# Patient Record
Sex: FEMALE | Race: WHITE | NOT HISPANIC OR LATINO | Employment: OTHER | URBAN - NONMETROPOLITAN AREA
[De-identification: names, ages, dates, MRNs, and addresses within clinical notes are randomized per-mention and may not be internally consistent; named-entity substitution may affect disease eponyms.]

---

## 2017-03-01 ENCOUNTER — OFFICE VISIT (OUTPATIENT)
Dept: GASTROENTEROLOGY | Facility: CLINIC | Age: 47
End: 2017-03-01

## 2017-03-01 VITALS
WEIGHT: 91.2 LBS | DIASTOLIC BLOOD PRESSURE: 62 MMHG | HEIGHT: 63 IN | SYSTOLIC BLOOD PRESSURE: 96 MMHG | HEART RATE: 75 BPM | TEMPERATURE: 98.1 F | OXYGEN SATURATION: 98 % | BODY MASS INDEX: 16.16 KG/M2

## 2017-03-01 DIAGNOSIS — R10.33 PERIUMBILICAL ABDOMINAL PAIN: Primary | ICD-10-CM

## 2017-03-01 DIAGNOSIS — R63.4 WEIGHT LOSS: ICD-10-CM

## 2017-03-01 PROCEDURE — 99203 OFFICE O/P NEW LOW 30 MIN: CPT | Performed by: NURSE PRACTITIONER

## 2017-03-01 NOTE — PROGRESS NOTES
Chief Complaint   Patient presents with   • Weight Loss     Patiet states she has lost 20 pounds in the last year.  She has been Dx with IBS in the past and has had problems with her thyroid before.  Past colonoscopys were done in [place], [place].       Subjective     HPI    Increasing weight loss x 1 yr.  Weight loss has worsened over past 6 months.  Review of records from referring physician indicate she wt 110 lb one year ago.  She has occasional dull pain in periumbilical area with radiation into LLQ.  No fever or chills.  She has nausea, no emesis.  Normal bowel habits described as alternating between loose/watery diarrhea and constipation.  She will not have BM for 3-4 days then experience few days with multiple loose/watery stools.  No BRBPR or melena stools.  Pt denies decrease in appetite.  She does have a history of heartburn but this is controlled with use of Nexium daily.  No dysphagia.  No family hx of CRC or colon polyps.  Pt is a smoker.  Last CScope over 5 yr ago in [place].  No hx of colon polyps.    Labs 2/9/17  TSH 0.794  Free T4 1.32  CBC unremarkable  T Bili 1.06  Direct Bili 0.21  Indirect Bili 0.9    Past Medical History   Diagnosis Date   • GERD (gastroesophageal reflux disease)    • Hyperlipidemia    • Hypothyroidism    • IBS (irritable bowel syndrome)    • Vaginal cancer        Past Surgical History   Procedure Laterality Date   • Colonoscopy     • Hysterectomy     • Back surgery     • Cholecystectomy     • Appendectomy     • Throat surgery         Outpatient Prescriptions Marked as Taking for the 3/1/17 encounter (Office Visit) with ALBA Kim   Medication Sig Dispense Refill   • ALPRAZolam (XANAX) 0.25 MG tablet Take 0.25 mg by mouth 3 (Three) Times a Day As Needed for anxiety.     • aspirin 81 MG EC tablet Take 81 mg by mouth Daily.     • baclofen (LIORESAL) 10 MG tablet Take 10 mg by mouth 3 (Three) Times a Day.     • diclofenac (VOLTAREN) 1 % gel gel Apply 4 g  topically 4 (Four) Times a Day.     • Diclofenac Sodium (PENNSAID) 2 % solution Place 20 mg on the skin 2 (Two) Times a Day.     • esomeprazole (nexIUM) 40 MG capsule Take 40 mg by mouth Daily.     • estrogens, conjugated, (PREMARIN) 1.25 MG tablet Take 1.25 mg by mouth Take As Directed.     • gabapentin (NEURONTIN) 300 MG capsule Take 300 mg by mouth 4 (Four) Times a Day.     • HYDROcodone-acetaminophen (NORCO)  MG per tablet Take 1 tablet by mouth Every 8 (Eight) Hours As Needed for moderate pain (4-6).     • levothyroxine (SYNTHROID, LEVOTHROID) 50 MCG tablet Take 50 mcg by mouth Daily.     • lidocaine (LIDODERM) 5 % Place 1 patch on the skin Daily. Remove & Discard patch within 12 hours or as directed by MD     • Multiple Vitamins-Minerals (MULTIVITAMIN ADULT PO) Take  by mouth Daily.     • simvastatin (ZOCOR) 20 MG tablet Take 20 mg by mouth Every Night.     • traMADol (ULTRAM) 50 MG tablet Take 50 mg by mouth Every 6 (Six) Hours As Needed for moderate pain (4-6).         No Known Allergies    Social History     Social History   • Marital status:      Spouse name: N/A   • Number of children: N/A   • Years of education: N/A     Occupational History   • Not on file.     Social History Main Topics   • Smoking status: Current Every Day Smoker     Types: Cigarettes   • Smokeless tobacco: Not on file   • Alcohol use No   • Drug use: Not on file   • Sexual activity: Not on file     Other Topics Concern   • Not on file     Social History Narrative       Family History   Problem Relation Age of Onset   • Colon cancer Neg Hx    • Colon polyps Neg Hx    • Esophageal cancer Neg Hx    • Liver cancer Neg Hx    • Liver disease Neg Hx    • Rectal cancer Neg Hx    • Stomach cancer Neg Hx        Review of Systems   Constitutional: Positive for unexpected weight change. Negative for fatigue and fever.   HENT: Negative for hearing loss, sore throat and voice change.    Eyes: Negative for visual disturbance.  "  Respiratory: Negative for cough, shortness of breath and wheezing.    Cardiovascular: Negative for chest pain and palpitations.   Gastrointestinal: Positive for abdominal pain. Negative for blood in stool and vomiting.   Endocrine: Negative for polydipsia and polyuria.   Genitourinary: Negative for difficulty urinating, dysuria, hematuria and urgency.   Musculoskeletal: Negative for joint swelling and myalgias.   Skin: Negative for color change, rash and wound.   Neurological: Negative for dizziness, tremors, seizures and syncope.   Hematological: Does not bruise/bleed easily.   Psychiatric/Behavioral: Negative for agitation and confusion. The patient is not nervous/anxious.        Objective     Vitals:    03/01/17 0900   BP: 96/62   Pulse: 75   Temp: 98.1 °F (36.7 °C)   SpO2: 98%   Weight: 91 lb 3.2 oz (41.4 kg)   Height: 63\" (160 cm)     Body mass index is 16.16 kg/(m^2).    Physical Exam   Constitutional: She is oriented to person, place, and time. She appears well-developed.   HENT:   Head: Normocephalic and atraumatic.   Eyes: Conjunctivae are normal. Pupils are equal, round, and reactive to light. No scleral icterus.   Neck: No JVD present. No thyroid mass and no thyromegaly present.   Cardiovascular: Normal rate, regular rhythm and normal heart sounds.  Exam reveals no gallop and no friction rub.    No murmur heard.  Pulmonary/Chest: Effort normal and breath sounds normal. No accessory muscle usage. No respiratory distress. She has no wheezes. She has no rales.   Abdominal: Soft. Bowel sounds are normal. She exhibits no distension, no ascites and no mass. There is no splenomegaly or hepatomegaly. There is no tenderness. There is no rebound and no guarding.   Genitourinary:   Genitourinary Comments: Rectal-Did not examine   Musculoskeletal: Normal range of motion. She exhibits no edema.   Neurological: She is alert and oriented to person, place, and time.   Deemed a reliable historian, able to converse " without difficulty and able to move all extremities without difficulty   Skin: Skin is warm and dry.   Psychiatric: She has a normal mood and affect. Her behavior is normal.       Imaging Results (most recent)     None          Assessment/Plan     Zahraa was seen today for weight loss.    Diagnoses and all orders for this visit:    Periumbilical abdominal pain  -     Case request  -     polyethylene glycol (GOLYTELY) 236 G solution; Take as directed    Weight loss  -     Cancel: CT Abdomen Pelvis With Contrast  -     CT Abdomen Pelvis With Contrast        COLONOSCOPY WITH ANESTHESIA (N/A)       All risks, benefits, alternatives, and indications of colonoscopy procedure have been discussed with the patient. Risks to include perforation of the colon requiring possible surgery or colostomy, risk of bleeding from biopsies or removal of colon tissue, possibility of missing a colon polyp or cancer, or adverse drug reaction.  Benefits to include the diagnosis and management of disease of the colon and rectum. Alternatives to include barium enema, radiographic evaluation, lab testing or no intervention. Pt verbalizes understanding and agrees to proceed with procedure.    EGD may be considered if above w/u is negative    There are no Patient Instructions on file for this visit.

## 2017-03-14 ENCOUNTER — HOSPITAL ENCOUNTER (OUTPATIENT)
Dept: GENERAL RADIOLOGY | Facility: HOSPITAL | Age: 47
Discharge: HOME | End: 2017-03-14

## 2017-03-14 ENCOUNTER — HOSPITAL ENCOUNTER (OUTPATIENT)
Dept: CT IMAGING | Facility: HOSPITAL | Age: 47
Discharge: HOME | End: 2017-03-14
Admitting: NURSE PRACTITIONER

## 2017-03-14 ENCOUNTER — TELEPHONE (OUTPATIENT)
Dept: GASTROENTEROLOGY | Facility: CLINIC | Age: 47
End: 2017-03-14

## 2017-03-14 ENCOUNTER — TRANSCRIBE ORDERS (OUTPATIENT)
Dept: ADMINISTRATIVE | Facility: HOSPITAL | Age: 47
End: 2017-03-14

## 2017-03-14 DIAGNOSIS — R63.4 LOSS OF WEIGHT: ICD-10-CM

## 2017-03-14 DIAGNOSIS — R63.4 LOSS OF WEIGHT: Primary | ICD-10-CM

## 2017-03-14 LAB — CREAT BLDA-MCNC: 0.6 MG/DL (ref 0.6–1.3)

## 2017-03-14 PROCEDURE — 82565 ASSAY OF CREATININE: CPT

## 2017-03-14 PROCEDURE — 71020 HC CHEST PA AND LATERAL: CPT

## 2017-03-14 PROCEDURE — 0 IOPAMIDOL 61 % SOLUTION: Performed by: NURSE PRACTITIONER

## 2017-03-14 PROCEDURE — 74177 CT ABD & PELVIS W/CONTRAST: CPT

## 2017-03-14 RX ADMIN — IOPAMIDOL 100 ML: 612 INJECTION, SOLUTION INTRAVENOUS at 10:15

## 2017-03-20 ENCOUNTER — ANESTHESIA EVENT (OUTPATIENT)
Dept: GASTROENTEROLOGY | Facility: HOSPITAL | Age: 47
End: 2017-03-20

## 2017-03-22 ENCOUNTER — HOSPITAL ENCOUNTER (OUTPATIENT)
Facility: HOSPITAL | Age: 47
Setting detail: HOSPITAL OUTPATIENT SURGERY
Discharge: HOME | End: 2017-03-22
Attending: INTERNAL MEDICINE | Admitting: INTERNAL MEDICINE

## 2017-03-22 ENCOUNTER — ANESTHESIA (OUTPATIENT)
Dept: GASTROENTEROLOGY | Facility: HOSPITAL | Age: 47
End: 2017-03-22

## 2017-03-22 VITALS
TEMPERATURE: 97.6 F | HEIGHT: 63 IN | WEIGHT: 84 LBS | DIASTOLIC BLOOD PRESSURE: 51 MMHG | SYSTOLIC BLOOD PRESSURE: 108 MMHG | BODY MASS INDEX: 14.88 KG/M2 | OXYGEN SATURATION: 100 % | RESPIRATION RATE: 14 BRPM | HEART RATE: 53 BPM

## 2017-03-22 PROCEDURE — 25010000002 PROPOFOL 10 MG/ML EMULSION: Performed by: NURSE ANESTHETIST, CERTIFIED REGISTERED

## 2017-03-22 PROCEDURE — 45378 DIAGNOSTIC COLONOSCOPY: CPT | Performed by: INTERNAL MEDICINE

## 2017-03-22 PROCEDURE — 83516 IMMUNOASSAY NONANTIBODY: CPT | Performed by: INTERNAL MEDICINE

## 2017-03-22 RX ORDER — SODIUM CHLORIDE 9 MG/ML
100 INJECTION, SOLUTION INTRAVENOUS CONTINUOUS
Status: DISCONTINUED | OUTPATIENT
Start: 2017-03-22 | End: 2017-03-22 | Stop reason: HOSPADM

## 2017-03-22 RX ORDER — PROPOFOL 10 MG/ML
VIAL (ML) INTRAVENOUS AS NEEDED
Status: DISCONTINUED | OUTPATIENT
Start: 2017-03-22 | End: 2017-03-22 | Stop reason: SURG

## 2017-03-22 RX ORDER — SODIUM CHLORIDE 0.9 % (FLUSH) 0.9 %
1-10 SYRINGE (ML) INJECTION AS NEEDED
Status: DISCONTINUED | OUTPATIENT
Start: 2017-03-22 | End: 2017-03-22 | Stop reason: HOSPADM

## 2017-03-22 RX ADMIN — PROPOFOL 25 MG: 10 INJECTION, EMULSION INTRAVENOUS at 09:34

## 2017-03-22 RX ADMIN — LIDOCAINE HYDROCHLORIDE 0.5 ML: 10 INJECTION, SOLUTION EPIDURAL; INFILTRATION; INTRACAUDAL; PERINEURAL at 08:45

## 2017-03-22 RX ADMIN — PROPOFOL 50 MG: 10 INJECTION, EMULSION INTRAVENOUS at 09:27

## 2017-03-22 RX ADMIN — PROPOFOL 25 MG: 10 INJECTION, EMULSION INTRAVENOUS at 09:32

## 2017-03-22 RX ADMIN — PROPOFOL 50 MG: 10 INJECTION, EMULSION INTRAVENOUS at 09:30

## 2017-03-22 RX ADMIN — PROPOFOL 25 MG: 10 INJECTION, EMULSION INTRAVENOUS at 09:37

## 2017-03-22 RX ADMIN — SODIUM CHLORIDE 100 ML/HR: 9 INJECTION, SOLUTION INTRAVENOUS at 08:45

## 2017-03-22 NOTE — PLAN OF CARE
Problem: Patient Care Overview (Adult)  Goal: Plan of Care Review  Outcome: Outcome(s) achieved Date Met:  03/22/17 03/22/17 0955   Patient Care Overview   Progress improving   Outcome Evaluation   Outcome Summary/Follow up Plan D/C CRITERIA MET   Coping/Psychosocial Response Interventions   Plan Of Care Reviewed With patient;spouse

## 2017-03-22 NOTE — ANESTHESIA POSTPROCEDURE EVALUATION
Patient: Zahraa Nice    Procedure Summary     Date Anesthesia Start Anesthesia Stop Room / Location    03/22/17 0924 0940  [facility] /  [facility]       Procedure Diagnosis Surgeon Provider    COLONOSCOPY WITH ANESTHESIA (N/A ) Periumbilical abdominal pain  (Periumbilical abdominal pain [R10.33]) DO Luis Gilbert CRNA          Anesthesia Type: general  Last vitals  BP      Temp      Pulse     Resp      SpO2        Post Anesthesia Care and Evaluation    Patient location during evaluation: PACU  Patient participation: complete - patient participated  Level of consciousness: awake and alert  Pain score: 0  Pain management: adequate  Airway patency: patent  Anesthetic complications: No anesthetic complications    Cardiovascular status: acceptable and stable  Respiratory status: acceptable  Hydration status: acceptable

## 2017-03-22 NOTE — ANESTHESIA PREPROCEDURE EVALUATION
Anesthesia Evaluation     Patient summary reviewed   no history of anesthetic complications:     Airway   Mallampati: I  TM distance: >3 FB  Neck ROM: full  no difficulty expected  Dental - normal exam     Pulmonary    (+) a smoker (1 ppd, yes today), COPD, asthma, shortness of breath,   Cardiovascular - negative cardio ROS  Exercise tolerance: (Uses cane for mobility)        Neuro/Psych  (+) weakness, numbness (to left leg following failed back surgery),    (-) seizures, TIA, CVA  GI/Hepatic/Renal/Endo    (+)  GERD, hypothyroidism,   (-) liver disease, renal disease, diabetes    ROS Comment: Unintentional weight loss    Musculoskeletal     (+) back pain,   Abdominal    Substance History - negative use     OB/GYN          Other - negative ROS                                   Anesthesia Plan    ASA 3     general     intravenous induction   Anesthetic plan and risks discussed with patient.

## 2017-03-22 NOTE — PLAN OF CARE
Problem: Patient Care Overview (Adult)  Goal: Adult Individualization and Mutuality    03/22/17 0819   Individualization   Patient Specific Preferences none   Patient Specific Goals none   Patient Specific Interventions none   Mutuality/Individual Preferences   What Anxieties, Fears or Concerns Do You Have About Your Health or Care? none   What Questions Do You Have About Your Health or Care? none   What Information Would Help Us Give You More Personalized Care? neon

## 2017-03-22 NOTE — H&P (VIEW-ONLY)
Chief Complaint   Patient presents with   • Weight Loss     Patiet states she has lost 20 pounds in the last year.  She has been Dx with IBS in the past and has had problems with her thyroid before.  Past colonoscopys were done in [place], [place].       Subjective     HPI    Increasing weight loss x 1 yr.  Weight loss has worsened over past 6 months.  Review of records from referring physician indicate she wt 110 lb one year ago.  She has occasional dull pain in periumbilical area with radiation into LLQ.  No fever or chills.  She has nausea, no emesis.  Normal bowel habits described as alternating between loose/watery diarrhea and constipation.  She will not have BM for 3-4 days then experience few days with multiple loose/watery stools.  No BRBPR or melena stools.  Pt denies decrease in appetite.  She does have a history of heartburn but this is controlled with use of Nexium daily.  No dysphagia.  No family hx of CRC or colon polyps.  Pt is a smoker.  Last CScope over 5 yr ago in [place].  No hx of colon polyps.    Labs 2/9/17  TSH 0.794  Free T4 1.32  CBC unremarkable  T Bili 1.06  Direct Bili 0.21  Indirect Bili 0.9    Past Medical History   Diagnosis Date   • GERD (gastroesophageal reflux disease)    • Hyperlipidemia    • Hypothyroidism    • IBS (irritable bowel syndrome)    • Vaginal cancer        Past Surgical History   Procedure Laterality Date   • Colonoscopy     • Hysterectomy     • Back surgery     • Cholecystectomy     • Appendectomy     • Throat surgery         Outpatient Prescriptions Marked as Taking for the 3/1/17 encounter (Office Visit) with ALBA Kim   Medication Sig Dispense Refill   • ALPRAZolam (XANAX) 0.25 MG tablet Take 0.25 mg by mouth 3 (Three) Times a Day As Needed for anxiety.     • aspirin 81 MG EC tablet Take 81 mg by mouth Daily.     • baclofen (LIORESAL) 10 MG tablet Take 10 mg by mouth 3 (Three) Times a Day.     • diclofenac (VOLTAREN) 1 % gel gel Apply 4 g  topically 4 (Four) Times a Day.     • Diclofenac Sodium (PENNSAID) 2 % solution Place 20 mg on the skin 2 (Two) Times a Day.     • esomeprazole (nexIUM) 40 MG capsule Take 40 mg by mouth Daily.     • estrogens, conjugated, (PREMARIN) 1.25 MG tablet Take 1.25 mg by mouth Take As Directed.     • gabapentin (NEURONTIN) 300 MG capsule Take 300 mg by mouth 4 (Four) Times a Day.     • HYDROcodone-acetaminophen (NORCO)  MG per tablet Take 1 tablet by mouth Every 8 (Eight) Hours As Needed for moderate pain (4-6).     • levothyroxine (SYNTHROID, LEVOTHROID) 50 MCG tablet Take 50 mcg by mouth Daily.     • lidocaine (LIDODERM) 5 % Place 1 patch on the skin Daily. Remove & Discard patch within 12 hours or as directed by MD     • Multiple Vitamins-Minerals (MULTIVITAMIN ADULT PO) Take  by mouth Daily.     • simvastatin (ZOCOR) 20 MG tablet Take 20 mg by mouth Every Night.     • traMADol (ULTRAM) 50 MG tablet Take 50 mg by mouth Every 6 (Six) Hours As Needed for moderate pain (4-6).         No Known Allergies    Social History     Social History   • Marital status:      Spouse name: N/A   • Number of children: N/A   • Years of education: N/A     Occupational History   • Not on file.     Social History Main Topics   • Smoking status: Current Every Day Smoker     Types: Cigarettes   • Smokeless tobacco: Not on file   • Alcohol use No   • Drug use: Not on file   • Sexual activity: Not on file     Other Topics Concern   • Not on file     Social History Narrative       Family History   Problem Relation Age of Onset   • Colon cancer Neg Hx    • Colon polyps Neg Hx    • Esophageal cancer Neg Hx    • Liver cancer Neg Hx    • Liver disease Neg Hx    • Rectal cancer Neg Hx    • Stomach cancer Neg Hx        Review of Systems   Constitutional: Positive for unexpected weight change. Negative for fatigue and fever.   HENT: Negative for hearing loss, sore throat and voice change.    Eyes: Negative for visual disturbance.  "  Respiratory: Negative for cough, shortness of breath and wheezing.    Cardiovascular: Negative for chest pain and palpitations.   Gastrointestinal: Positive for abdominal pain. Negative for blood in stool and vomiting.   Endocrine: Negative for polydipsia and polyuria.   Genitourinary: Negative for difficulty urinating, dysuria, hematuria and urgency.   Musculoskeletal: Negative for joint swelling and myalgias.   Skin: Negative for color change, rash and wound.   Neurological: Negative for dizziness, tremors, seizures and syncope.   Hematological: Does not bruise/bleed easily.   Psychiatric/Behavioral: Negative for agitation and confusion. The patient is not nervous/anxious.        Objective     Vitals:    03/01/17 0900   BP: 96/62   Pulse: 75   Temp: 98.1 °F (36.7 °C)   SpO2: 98%   Weight: 91 lb 3.2 oz (41.4 kg)   Height: 63\" (160 cm)     Body mass index is 16.16 kg/(m^2).    Physical Exam   Constitutional: She is oriented to person, place, and time. She appears well-developed.   HENT:   Head: Normocephalic and atraumatic.   Eyes: Conjunctivae are normal. Pupils are equal, round, and reactive to light. No scleral icterus.   Neck: No JVD present. No thyroid mass and no thyromegaly present.   Cardiovascular: Normal rate, regular rhythm and normal heart sounds.  Exam reveals no gallop and no friction rub.    No murmur heard.  Pulmonary/Chest: Effort normal and breath sounds normal. No accessory muscle usage. No respiratory distress. She has no wheezes. She has no rales.   Abdominal: Soft. Bowel sounds are normal. She exhibits no distension, no ascites and no mass. There is no splenomegaly or hepatomegaly. There is no tenderness. There is no rebound and no guarding.   Genitourinary:   Genitourinary Comments: Rectal-Did not examine   Musculoskeletal: Normal range of motion. She exhibits no edema.   Neurological: She is alert and oriented to person, place, and time.   Deemed a reliable historian, able to converse " without difficulty and able to move all extremities without difficulty   Skin: Skin is warm and dry.   Psychiatric: She has a normal mood and affect. Her behavior is normal.       Imaging Results (most recent)     None          Assessment/Plan     Zahraa was seen today for weight loss.    Diagnoses and all orders for this visit:    Periumbilical abdominal pain  -     Case request  -     polyethylene glycol (GOLYTELY) 236 G solution; Take as directed    Weight loss  -     Cancel: CT Abdomen Pelvis With Contrast  -     CT Abdomen Pelvis With Contrast        COLONOSCOPY WITH ANESTHESIA (N/A)       All risks, benefits, alternatives, and indications of colonoscopy procedure have been discussed with the patient. Risks to include perforation of the colon requiring possible surgery or colostomy, risk of bleeding from biopsies or removal of colon tissue, possibility of missing a colon polyp or cancer, or adverse drug reaction.  Benefits to include the diagnosis and management of disease of the colon and rectum. Alternatives to include barium enema, radiographic evaluation, lab testing or no intervention. Pt verbalizes understanding and agrees to proceed with procedure.    EGD may be considered if above w/u is negative    There are no Patient Instructions on file for this visit.

## 2017-03-23 LAB
ENDOMYSIUM IGA SER QL: NEGATIVE
GLIADIN PEPTIDE IGA SER-ACNC: 2 UNITS (ref 0–19)
GLIADIN PEPTIDE IGG SER-ACNC: 2 UNITS (ref 0–19)
IGA SERPL-MCNC: 96 MG/DL (ref 87–352)
TTG IGA SER-ACNC: <2 U/ML (ref 0–3)
TTG IGG SER-ACNC: <2 U/ML (ref 0–5)

## 2017-03-29 ENCOUNTER — TELEPHONE (OUTPATIENT)
Dept: GASTROENTEROLOGY | Facility: CLINIC | Age: 47
End: 2017-03-29

## 2017-03-29 NOTE — TELEPHONE ENCOUNTER
Left message for patient that her bx was negative per Dr. Espino and she could call back if she had any questions.

## 2017-03-29 NOTE — TELEPHONE ENCOUNTER
----- Message from Yeison Espino DO sent at 3/24/2017  7:25 AM CDT -----  Please let her know her celiac panel was neg

## 2017-09-06 ENCOUNTER — OFFICE VISIT (OUTPATIENT)
Dept: GASTROENTEROLOGY | Facility: CLINIC | Age: 47
End: 2017-09-06

## 2017-09-06 VITALS
HEART RATE: 84 BPM | HEIGHT: 63 IN | DIASTOLIC BLOOD PRESSURE: 56 MMHG | WEIGHT: 85 LBS | BODY MASS INDEX: 15.06 KG/M2 | SYSTOLIC BLOOD PRESSURE: 100 MMHG | TEMPERATURE: 98 F

## 2017-09-06 DIAGNOSIS — R63.4 WEIGHT LOSS, ABNORMAL: ICD-10-CM

## 2017-09-06 DIAGNOSIS — K21.9 GASTROESOPHAGEAL REFLUX DISEASE, ESOPHAGITIS PRESENCE NOT SPECIFIED: Primary | ICD-10-CM

## 2017-09-06 PROCEDURE — 99213 OFFICE O/P EST LOW 20 MIN: CPT | Performed by: NURSE PRACTITIONER

## 2017-09-06 NOTE — PROGRESS NOTES
"Chief Complaint   Patient presents with   • Heartburn     having lots of gerd nausea wt loss 20lbs last year       Subjective     HPI    Review of previous office note indicates patient weighed 110 lb apx 18 months ago.  In March 2017 she weighed 91 lb in our office.  Today she weighs 85 lb.  She has chronic abdominal pain, pain she is currently experiencing is described as her \"normal.\"  She cannot tolerate lactose.  Drinking caffeine burns as it goes down her throat.  She believes her voice is \"deeper.\"  Heartburn and indigestion controlled with use of Nexium.  She has increase use of Pepto Bismol due to nausea.  Takes 2 Excedrin pills apx 2 times per week.  She is more nauseated at night when lying down.  No emesis.  Appetite described as stable.  She reports she eats apx 2 meals per day.  She reports she is currently under increased stress.    CScope (Dr Espino) 3/2017 -procedure was normal,     CT abd/pelvis with contrast on 3/14/17-did not identify intra abdominal or pelvic abnormality.  No metastatic disease observed. (CT performed at [facility])       Past Medical History:   Diagnosis Date   • GERD (gastroesophageal reflux disease)    • Hyperlipidemia    • Hypothyroidism    • IBS (irritable bowel syndrome)    • PONV (postoperative nausea and vomiting)    • Vaginal cancer        Past Surgical History:   Procedure Laterality Date   • APPENDECTOMY     • BACK SURGERY     • CHOLECYSTECTOMY     • COLONOSCOPY     • COLONOSCOPY N/A 3/22/2017    Procedure: COLONOSCOPY WITH ANESTHESIA;  Surgeon: Yeison Espino DO;  Location: [facility] ENDOSCOPY;  Service:    • HYSTERECTOMY     • THROAT SURGERY         Outpatient Prescriptions Marked as Taking for the 9/6/17 encounter (Office Visit) with ALBA Kim   Medication Sig Dispense Refill   • ALPRAZolam (XANAX) 0.25 MG tablet Take 0.25 mg by mouth 3 (Three) Times a Day As Needed for anxiety.     • aspirin 81 MG EC tablet Take 81 mg by mouth Daily.     • baclofen " (LIORESAL) 10 MG tablet Take 10 mg by mouth 3 (Three) Times a Day.     • diclofenac (VOLTAREN) 1 % gel gel Apply 4 g topically 4 (Four) Times a Day.     • Diclofenac Sodium (PENNSAID) 2 % solution Place 20 mg on the skin 2 (Two) Times a Day.     • esomeprazole (nexIUM) 40 MG capsule Take 40 mg by mouth Daily.     • estrogens, conjugated, (PREMARIN) 1.25 MG tablet Take 1.25 mg by mouth Take As Directed.     • gabapentin (NEURONTIN) 300 MG capsule Take 300 mg by mouth 4 (Four) Times a Day.     • HYDROcodone-acetaminophen (NORCO)  MG per tablet Take 1 tablet by mouth Every 8 (Eight) Hours As Needed for moderate pain (4-6).     • levothyroxine (SYNTHROID, LEVOTHROID) 50 MCG tablet Take 50 mcg by mouth Daily.     • lidocaine (LIDODERM) 5 % Place 1 patch on the skin Daily. Remove & Discard patch within 12 hours or as directed by MD     • Multiple Vitamins-Minerals (MULTIVITAMIN ADULT PO) Take  by mouth Daily.     • simvastatin (ZOCOR) 20 MG tablet Take 20 mg by mouth Every Night.     • traMADol (ULTRAM) 50 MG tablet Take 50 mg by mouth Every 6 (Six) Hours As Needed for moderate pain (4-6).         No Known Allergies    Social History     Social History   • Marital status:      Spouse name: N/A   • Number of children: N/A   • Years of education: N/A     Occupational History   • Not on file.     Social History Main Topics   • Smoking status: Current Every Day Smoker     Years: 30.00     Types: Cigarettes   • Smokeless tobacco: Never Used   • Alcohol use No   • Drug use: Yes     Special: Marijuana   • Sexual activity: Defer     Other Topics Concern   • Not on file     Social History Narrative       Family History   Problem Relation Age of Onset   • Colon cancer Neg Hx    • Colon polyps Neg Hx    • Esophageal cancer Neg Hx    • Liver cancer Neg Hx    • Liver disease Neg Hx    • Rectal cancer Neg Hx    • Stomach cancer Neg Hx        Review of Systems   Constitutional: Positive for unexpected weight change.  "Negative for fatigue and fever.   HENT: Negative for hearing loss, sore throat and voice change.    Eyes: Negative for visual disturbance.   Respiratory: Negative for cough, shortness of breath and wheezing.    Cardiovascular: Negative for chest pain and palpitations.   Gastrointestinal: Positive for nausea. Negative for abdominal pain, blood in stool and vomiting.   Endocrine: Negative for polydipsia and polyuria.   Genitourinary: Negative for difficulty urinating, dysuria, hematuria and urgency.   Musculoskeletal: Negative for joint swelling and myalgias.   Skin: Negative for color change, rash and wound.   Neurological: Negative for dizziness, tremors, seizures and syncope.   Hematological: Does not bruise/bleed easily.   Psychiatric/Behavioral: Negative for agitation and confusion. The patient is not nervous/anxious.        Objective     Vitals:    09/06/17 0956   BP: 100/56   Pulse: 84   Temp: 98 °F (36.7 °C)   Weight: 85 lb (38.6 kg)   Height: 63\" (160 cm)     Body mass index is 15.06 kg/(m^2).    Physical Exam   Constitutional: She is oriented to person, place, and time. She appears well-developed and well-nourished.   HENT:   Head: Normocephalic and atraumatic.   Eyes: Conjunctivae are normal. Pupils are equal, round, and reactive to light. No scleral icterus.   Neck: No JVD present. No thyroid mass and no thyromegaly present.   Cardiovascular: Normal rate, regular rhythm and normal heart sounds.  Exam reveals no gallop and no friction rub.    No murmur heard.  Pulmonary/Chest: Effort normal and breath sounds normal. No accessory muscle usage. No respiratory distress. She has no wheezes. She has no rales.   Abdominal: Soft. Bowel sounds are normal. She exhibits no distension, no ascites and no mass. There is no splenomegaly or hepatomegaly. There is no tenderness. There is no rebound and no guarding.   Genitourinary:   Genitourinary Comments: Rectal-Did not examine   Musculoskeletal: Normal range of motion. " She exhibits no edema.   Neurological: She is alert and oriented to person, place, and time.   Deemed a reliable historian, able to converse without difficulty and able to move all extremities without difficulty   Skin: Skin is warm and dry.   Psychiatric: She has a normal mood and affect. Her behavior is normal.       Imaging Results (most recent)     None          Assessment/Plan     Zahraa was seen today for heartburn.    Diagnoses and all orders for this visit:    Gastroesophageal reflux disease, esophagitis presence not specified  -     Case Request; Standing  -     Implement Anesthesia Orders Day of Procedure; Standing  -     Obtain Informed Consent; Standing  -     Case Request    Weight loss, abnormal      ESOPHAGOGASTRODUODENOSCOPY WITH ANESTHESIA (N/A)    Advised pt to stop ASA day prior to procedure and to stop use of NSAIDs, Fish Oil, and MV 5 days prior to procedure.  Tylenol based products are ok to take.  Pt verbalized understanding.    During this visit I spent 3 minutes counseling patient on the importance of smoking cessation.  I advised the patient of the risks involved with the continued use of tobacco and provided the patient with smoking cessation education materials.    The risk of the endoscopy were discussed in detail.  We discussed the risk of perforation (one out of 1632-8626, riskier with dilation), bleeding (one out of 500), and the rare risks of infection, adverse reaction to anesthesia, respiratory failure, cardiac failure including MI and adverse reaction to medications, etc.  We discussed consequences that could occur if a risk were to develop such as the need for hospitalization, blood transfusion, surgical intervention, medications, pain and disability and death.  Alternatives include not doing anything, or pursuing an UGI series which only offers a diagnosis with potential less accuracy compared to egd.  The patient verbalizes understanding and agrees to proceed.        Patient  Instructions   Steps to Quit Smoking   Smoking tobacco can be harmful to your health and can affect almost every organ in your body. Smoking puts you, and those around you, at risk for developing many serious chronic diseases. Quitting smoking is difficult, but it is one of the best things that you can do for your health. It is never too late to quit.  WHAT ARE THE BENEFITS OF QUITTING SMOKING?  When you quit smoking, you lower your risk of developing serious diseases and conditions, such as:  · Lung cancer or lung disease, such as COPD.  · Heart disease.  · Stroke.  · Heart attack.  · Infertility.  · Osteoporosis and bone fractures.  Additionally, symptoms such as coughing, wheezing, and shortness of breath may get better when you quit. You may also find that you get sick less often because your body is stronger at fighting off colds and infections. If you are pregnant, quitting smoking can help to reduce your chances of having a baby of low birth weight.  HOW DO I GET READY TO QUIT?  When you decide to quit smoking, create a plan to make sure that you are successful. Before you quit:  · Pick a date to quit. Set a date within the next two weeks to give you time to prepare.  · Write down the reasons why you are quitting. Keep this list in places where you will see it often, such as on your bathroom mirror or in your car or wallet.  · Identify the people, places, things, and activities that make you want to smoke (triggers) and avoid them. Make sure to take these actions:    Throw away all cigarettes at home, at work, and in your car.    Throw away smoking accessories, such as ashtrays and lighters.    Clean your car and make sure to empty the ashtray.    Clean your home, including curtains and carpets.  · Tell your family, friends, and coworkers that you are quitting. Support from your loved ones can make quitting easier.  · Talk with your health care provider about your options for quitting smoking.  · Find out  "what treatment options are covered by your health insurance.  WHAT STRATEGIES CAN I USE TO QUIT SMOKING?   Talk with your healthcare provider about different strategies to quit smoking. Some strategies include:  · Quitting smoking altogether instead of gradually lessening how much you smoke over a period of time. Research shows that quitting \"cold turkey\" is more successful than gradually quitting.  · Attending in-person counseling to help you build problem-solving skills. You are more likely to have success in quitting if you attend several counseling sessions. Even short sessions of 10 minutes can be effective.  · Finding resources and support systems that can help you to quit smoking and remain smoke-free after you quit. These resources are most helpful when you use them often. They can include:    Online chats with a counselor.    Telephone quitlines.    Printed self-help materials.    Support groups or group counseling.    Text messaging programs.    Mobile phone applications.  · Taking medicines to help you quit smoking. (If you are pregnant or breastfeeding, talk with your health care provider first.) Some medicines contain nicotine and some do not. Both types of medicines help with cravings, but the medicines that include nicotine help to relieve withdrawal symptoms. Your health care provider may recommend:    Nicotine patches, gum, or lozenges.    Nicotine inhalers or sprays.    Non-nicotine medicine that is taken by mouth.  Talk with your health care provider about combining strategies, such as taking medicines while you are also receiving in-person counseling. Using these two strategies together makes you more likely to succeed in quitting than if you used either strategy on its own.  If you are pregnant or breastfeeding, talk with your health care provider about finding counseling or other support strategies to quit smoking. Do not take medicine to help you quit smoking unless told to do so by your " health care provider.  WHAT THINGS CAN I DO TO MAKE IT EASIER TO QUIT?  Quitting smoking might feel overwhelming at first, but there is a lot that you can do to make it easier. Take these important actions:  · Reach out to your family and friends and ask that they support and encourage you during this time. Call telephone quitlines, reach out to support groups, or work with a counselor for support.  · Ask people who smoke to avoid smoking around you.  · Avoid places that trigger you to smoke, such as bars, parties, or smoke-break areas at work.  · Spend time around people who do not smoke.  · Lessen stress in your life, because stress can be a smoking trigger for some people. To lessen stress, try:    Exercising regularly.    Deep-breathing exercises.    Yoga.    Meditating.    Performing a body scan. This involves closing your eyes, scanning your body from head to toe, and noticing which parts of your body are particularly tense. Purposefully relax the muscles in those areas.  · Download or purchase mobile phone or tablet apps (applications) that can help you stick to your quit plan by providing reminders, tips, and encouragement. There are many free apps, such as QuitGuide from the CDC (Centers for Disease Control and Prevention). You can find other support for quitting smoking (smoking cessation) through smokefree.gov and other websites.  HOW WILL I FEEL WHEN I QUIT SMOKING?  Within the first 24 hours of quitting smoking, you may start to feel some withdrawal symptoms. These symptoms are usually most noticeable 2-3 days after quitting, but they usually do not last beyond 2-3 weeks. Changes or symptoms that you might experience include:  · Mood swings.  · Restlessness, anxiety, or irritation.  · Difficulty concentrating.  · Dizziness.  · Strong cravings for sugary foods in addition to nicotine.  · Mild weight gain.  · Constipation.  · Nausea.  · Coughing or a sore throat.  · Changes in how your medicines work in  your body.  · A depressed mood.  · Difficulty sleeping (insomnia).  After the first 2-3 weeks of quitting, you may start to notice more positive results, such as:  · Improved sense of smell and taste.  · Decreased coughing and sore throat.  · Slower heart rate.  · Lower blood pressure.  · Clearer skin.  · The ability to breathe more easily.  · Fewer sick days.  Quitting smoking is very challenging for most people. Do not get discouraged if you are not successful the first time. Some people need to make many attempts to quit before they achieve long-term success. Do your best to stick to your quit plan, and talk with your health care provider if you have any questions or concerns.     This information is not intended to replace advice given to you by your health care provider. Make sure you discuss any questions you have with your health care provider.     Document Released: 12/12/2002 Document Revised: 05/03/2016 Document Reviewed: 05/03/2016  Peer[employer] Interactive Patient Education ©2017 [employer]

## 2017-09-06 NOTE — PATIENT INSTRUCTIONS
Steps to Quit Smoking   Smoking tobacco can be harmful to your health and can affect almost every organ in your body. Smoking puts you, and those around you, at risk for developing many serious chronic diseases. Quitting smoking is difficult, but it is one of the best things that you can do for your health. It is never too late to quit.  WHAT ARE THE BENEFITS OF QUITTING SMOKING?  When you quit smoking, you lower your risk of developing serious diseases and conditions, such as:  · Lung cancer or lung disease, such as COPD.  · Heart disease.  · Stroke.  · Heart attack.  · Infertility.  · Osteoporosis and bone fractures.  Additionally, symptoms such as coughing, wheezing, and shortness of breath may get better when you quit. You may also find that you get sick less often because your body is stronger at fighting off colds and infections. If you are pregnant, quitting smoking can help to reduce your chances of having a baby of low birth weight.  HOW DO I GET READY TO QUIT?  When you decide to quit smoking, create a plan to make sure that you are successful. Before you quit:  · Pick a date to quit. Set a date within the next two weeks to give you time to prepare.  · Write down the reasons why you are quitting. Keep this list in places where you will see it often, such as on your bathroom mirror or in your car or wallet.  · Identify the people, places, things, and activities that make you want to smoke (triggers) and avoid them. Make sure to take these actions:    Throw away all cigarettes at home, at work, and in your car.    Throw away smoking accessories, such as ashtrays and lighters.    Clean your car and make sure to empty the ashtray.    Clean your home, including curtains and carpets.  · Tell your family, friends, and coworkers that you are quitting. Support from your loved ones can make quitting easier.  · Talk with your health care provider about your options for quitting smoking.  · Find out what treatment  "options are covered by your health insurance.  WHAT STRATEGIES CAN I USE TO QUIT SMOKING?   Talk with your healthcare provider about different strategies to quit smoking. Some strategies include:  · Quitting smoking altogether instead of gradually lessening how much you smoke over a period of time. Research shows that quitting \"cold turkey\" is more successful than gradually quitting.  · Attending in-person counseling to help you build problem-solving skills. You are more likely to have success in quitting if you attend several counseling sessions. Even short sessions of 10 minutes can be effective.  · Finding resources and support systems that can help you to quit smoking and remain smoke-free after you quit. These resources are most helpful when you use them often. They can include:    Online chats with a counselor.    Telephone quitlines.    Printed self-help materials.    Support groups or group counseling.    Text messaging programs.    Mobile phone applications.  · Taking medicines to help you quit smoking. (If you are pregnant or breastfeeding, talk with your health care provider first.) Some medicines contain nicotine and some do not. Both types of medicines help with cravings, but the medicines that include nicotine help to relieve withdrawal symptoms. Your health care provider may recommend:    Nicotine patches, gum, or lozenges.    Nicotine inhalers or sprays.    Non-nicotine medicine that is taken by mouth.  Talk with your health care provider about combining strategies, such as taking medicines while you are also receiving in-person counseling. Using these two strategies together makes you more likely to succeed in quitting than if you used either strategy on its own.  If you are pregnant or breastfeeding, talk with your health care provider about finding counseling or other support strategies to quit smoking. Do not take medicine to help you quit smoking unless told to do so by your health care " provider.  WHAT THINGS CAN I DO TO MAKE IT EASIER TO QUIT?  Quitting smoking might feel overwhelming at first, but there is a lot that you can do to make it easier. Take these important actions:  · Reach out to your family and friends and ask that they support and encourage you during this time. Call telephone quitlines, reach out to support groups, or work with a counselor for support.  · Ask people who smoke to avoid smoking around you.  · Avoid places that trigger you to smoke, such as bars, parties, or smoke-break areas at work.  · Spend time around people who do not smoke.  · Lessen stress in your life, because stress can be a smoking trigger for some people. To lessen stress, try:    Exercising regularly.    Deep-breathing exercises.    Yoga.    Meditating.    Performing a body scan. This involves closing your eyes, scanning your body from head to toe, and noticing which parts of your body are particularly tense. Purposefully relax the muscles in those areas.  · Download or purchase mobile phone or tablet apps (applications) that can help you stick to your quit plan by providing reminders, tips, and encouragement. There are many free apps, such as QuitGuide from the CDC (Centers for Disease Control and Prevention). You can find other support for quitting smoking (smoking cessation) through smokefree.gov and other websites.  HOW WILL I FEEL WHEN I QUIT SMOKING?  Within the first 24 hours of quitting smoking, you may start to feel some withdrawal symptoms. These symptoms are usually most noticeable 2-3 days after quitting, but they usually do not last beyond 2-3 weeks. Changes or symptoms that you might experience include:  · Mood swings.  · Restlessness, anxiety, or irritation.  · Difficulty concentrating.  · Dizziness.  · Strong cravings for sugary foods in addition to nicotine.  · Mild weight gain.  · Constipation.  · Nausea.  · Coughing or a sore throat.  · Changes in how your medicines work in your  body.  · A depressed mood.  · Difficulty sleeping (insomnia).  After the first 2-3 weeks of quitting, you may start to notice more positive results, such as:  · Improved sense of smell and taste.  · Decreased coughing and sore throat.  · Slower heart rate.  · Lower blood pressure.  · Clearer skin.  · The ability to breathe more easily.  · Fewer sick days.  Quitting smoking is very challenging for most people. Do not get discouraged if you are not successful the first time. Some people need to make many attempts to quit before they achieve long-term success. Do your best to stick to your quit plan, and talk with your health care provider if you have any questions or concerns.     This information is not intended to replace advice given to you by your health care provider. Make sure you discuss any questions you have with your health care provider.     Document Released: 12/12/2002 Document Revised: 05/03/2016 Document Reviewed: 05/03/2016  [employer] Interactive Patient Education ©2017 [employer].

## 2017-09-12 ENCOUNTER — ANESTHESIA EVENT (OUTPATIENT)
Dept: GASTROENTEROLOGY | Facility: HOSPITAL | Age: 47
End: 2017-09-12

## 2017-09-12 ENCOUNTER — ANESTHESIA (OUTPATIENT)
Dept: GASTROENTEROLOGY | Facility: HOSPITAL | Age: 47
End: 2017-09-12

## 2017-09-12 ENCOUNTER — HOSPITAL ENCOUNTER (OUTPATIENT)
Facility: HOSPITAL | Age: 47
Setting detail: HOSPITAL OUTPATIENT SURGERY
Discharge: HOME | End: 2017-09-12
Attending: INTERNAL MEDICINE | Admitting: INTERNAL MEDICINE

## 2017-09-12 VITALS
WEIGHT: 83 LBS | SYSTOLIC BLOOD PRESSURE: 105 MMHG | BODY MASS INDEX: 14.71 KG/M2 | TEMPERATURE: 98 F | OXYGEN SATURATION: 100 % | HEART RATE: 63 BPM | DIASTOLIC BLOOD PRESSURE: 64 MMHG | RESPIRATION RATE: 12 BRPM | HEIGHT: 63 IN

## 2017-09-12 DIAGNOSIS — K21.9 GASTROESOPHAGEAL REFLUX DISEASE, ESOPHAGITIS PRESENCE NOT SPECIFIED: ICD-10-CM

## 2017-09-12 PROCEDURE — 25010000002 PROPOFOL 10 MG/ML EMULSION: Performed by: NURSE ANESTHETIST, CERTIFIED REGISTERED

## 2017-09-12 PROCEDURE — 87081 CULTURE SCREEN ONLY: CPT | Performed by: INTERNAL MEDICINE

## 2017-09-12 PROCEDURE — 43239 EGD BIOPSY SINGLE/MULTIPLE: CPT | Performed by: INTERNAL MEDICINE

## 2017-09-12 PROCEDURE — 88305 TISSUE EXAM BY PATHOLOGIST: CPT | Performed by: INTERNAL MEDICINE

## 2017-09-12 RX ORDER — PROPOFOL 10 MG/ML
VIAL (ML) INTRAVENOUS AS NEEDED
Status: DISCONTINUED | OUTPATIENT
Start: 2017-09-12 | End: 2017-09-12 | Stop reason: SURG

## 2017-09-12 RX ORDER — SODIUM CHLORIDE 9 MG/ML
500 INJECTION, SOLUTION INTRAVENOUS CONTINUOUS PRN
Status: DISCONTINUED | OUTPATIENT
Start: 2017-09-12 | End: 2017-09-12 | Stop reason: HOSPADM

## 2017-09-12 RX ORDER — SODIUM CHLORIDE 0.9 % (FLUSH) 0.9 %
3 SYRINGE (ML) INJECTION AS NEEDED
Status: DISCONTINUED | OUTPATIENT
Start: 2017-09-12 | End: 2017-09-12 | Stop reason: HOSPADM

## 2017-09-12 RX ADMIN — PROPOFOL 30 MG: 10 INJECTION, EMULSION INTRAVENOUS at 11:59

## 2017-09-12 RX ADMIN — PROPOFOL 100 MG: 10 INJECTION, EMULSION INTRAVENOUS at 11:57

## 2017-09-12 RX ADMIN — SODIUM CHLORIDE 500 ML: 9 INJECTION, SOLUTION INTRAVENOUS at 10:57

## 2017-09-12 NOTE — PLAN OF CARE
Problem: Patient Care Overview (Adult)  Goal: Plan of Care Review  Outcome: Outcome(s) achieved Date Met:  09/12/17 09/12/17 1209   Coping/Psychosocial Response Interventions   Plan Of Care Reviewed With patient;spouse   Patient Care Overview   Progress improving   Outcome Evaluation   Outcome Summary/Follow up Plan DISCHARGE CRITERIA MET

## 2017-09-12 NOTE — PLAN OF CARE
Problem: GI Endoscopy (Adult)  Goal: Signs and Symptoms of Listed Potential Problems Will be Absent or Manageable (GI Endoscopy)  Outcome: Outcome(s) achieved Date Met:  09/12/17 09/12/17 1209   GI Endoscopy   Problems Assessed (GI Endoscopy) all   Problems Present (GI Endoscopy) none

## 2017-09-12 NOTE — ANESTHESIA POSTPROCEDURE EVALUATION
Patient: Zahraa Nice    Procedure Summary     Date Anesthesia Start Anesthesia Stop Room / Location    09/12/17 1151 1203 [facility] ENDOSCOPY 2 / [facility]       Procedure Diagnosis Surgeon Provider    ESOPHAGOGASTRODUODENOSCOPY WITH ANESTHESIA (N/A Esophagus) Gastroesophageal reflux disease, esophagitis presence not specified  (Gastroesophageal reflux disease, esophagitis presence not specified [K21.9]) Yeison Espino, DO Kris Muller, ROQUE          Anesthesia Type: general  Last vitals  BP        Temp        Pulse       Resp        SpO2          Post Anesthesia Care and Evaluation    Patient location during evaluation: PHASE II  Patient participation: complete - patient participated  Level of consciousness: awake and alert  Pain score: 0  Pain management: adequate  Airway patency: patent  Anesthetic complications: No anesthetic complications  PONV Status: none  Cardiovascular status: acceptable and stable  Respiratory status: acceptable and unassisted  Hydration status: acceptable

## 2017-09-12 NOTE — ANESTHESIA PREPROCEDURE EVALUATION
Anesthesia Evaluation     Patient summary reviewed   no history of anesthetic complications:         Airway   Mallampati: I  TM distance: >3 FB  Neck ROM: full  no difficulty expected  Dental - normal exam     Pulmonary    (+) a smoker (1 ppd, yes today), COPD, asthma, shortness of breath,   Cardiovascular   Exercise tolerance: (Uses cane for mobility)    (+) hyperlipidemia      Neuro/Psych  (+) weakness, numbness (to left leg following failed back surgery),    (-) seizures, TIA, CVA  GI/Hepatic/Renal/Endo    (+)  GERD, hypothyroidism,   (-) liver disease, diabetes    ROS Comment: Unintentional weight loss    Musculoskeletal     (+) back pain,   Abdominal    Substance History - negative use     OB/GYN          Other - negative ROS                                         Anesthesia Plan    ASA 3     general   total IV anesthesia  intravenous induction   Anesthetic plan and risks discussed with patient.

## 2017-09-12 NOTE — H&P (VIEW-ONLY)
"Chief Complaint   Patient presents with   • Heartburn     having lots of gerd nausea wt loss 20lbs last year       Subjective     HPI    Review of previous office note indicates patient weighed 110 lb apx 18 months ago.  In March 2017 she weighed 91 lb in our office.  Today she weighs 85 lb.  She has chronic abdominal pain, pain she is currently experiencing is described as her \"normal.\"  She cannot tolerate lactose.  Drinking caffeine burns as it goes down her throat.  She believes her voice is \"deeper.\"  Heartburn and indigestion controlled with use of Nexium.  She has increase use of Pepto Bismol due to nausea.  Takes 2 Excedrin pills apx 2 times per week.  She is more nauseated at night when lying down.  No emesis.  Appetite described as stable.  She reports she eats apx 2 meals per day.  She reports she is currently under increased stress.    CScope (Dr Espino) 3/2017 -procedure was normal,     CT abd/pelvis with contrast on 3/14/17-did not identify intra abdominal or pelvic abnormality.  No metastatic disease observed. (CT performed at [facility])       Past Medical History:   Diagnosis Date   • GERD (gastroesophageal reflux disease)    • Hyperlipidemia    • Hypothyroidism    • IBS (irritable bowel syndrome)    • PONV (postoperative nausea and vomiting)    • Vaginal cancer        Past Surgical History:   Procedure Laterality Date   • APPENDECTOMY     • BACK SURGERY     • CHOLECYSTECTOMY     • COLONOSCOPY     • COLONOSCOPY N/A 3/22/2017    Procedure: COLONOSCOPY WITH ANESTHESIA;  Surgeon: Yeison Espino DO;  Location: [facility] ENDOSCOPY;  Service:    • HYSTERECTOMY     • THROAT SURGERY         Outpatient Prescriptions Marked as Taking for the 9/6/17 encounter (Office Visit) with ALBA Kim   Medication Sig Dispense Refill   • ALPRAZolam (XANAX) 0.25 MG tablet Take 0.25 mg by mouth 3 (Three) Times a Day As Needed for anxiety.     • aspirin 81 MG EC tablet Take 81 mg by mouth Daily.     • baclofen " (LIORESAL) 10 MG tablet Take 10 mg by mouth 3 (Three) Times a Day.     • diclofenac (VOLTAREN) 1 % gel gel Apply 4 g topically 4 (Four) Times a Day.     • Diclofenac Sodium (PENNSAID) 2 % solution Place 20 mg on the skin 2 (Two) Times a Day.     • esomeprazole (nexIUM) 40 MG capsule Take 40 mg by mouth Daily.     • estrogens, conjugated, (PREMARIN) 1.25 MG tablet Take 1.25 mg by mouth Take As Directed.     • gabapentin (NEURONTIN) 300 MG capsule Take 300 mg by mouth 4 (Four) Times a Day.     • HYDROcodone-acetaminophen (NORCO)  MG per tablet Take 1 tablet by mouth Every 8 (Eight) Hours As Needed for moderate pain (4-6).     • levothyroxine (SYNTHROID, LEVOTHROID) 50 MCG tablet Take 50 mcg by mouth Daily.     • lidocaine (LIDODERM) 5 % Place 1 patch on the skin Daily. Remove & Discard patch within 12 hours or as directed by MD     • Multiple Vitamins-Minerals (MULTIVITAMIN ADULT PO) Take  by mouth Daily.     • simvastatin (ZOCOR) 20 MG tablet Take 20 mg by mouth Every Night.     • traMADol (ULTRAM) 50 MG tablet Take 50 mg by mouth Every 6 (Six) Hours As Needed for moderate pain (4-6).         No Known Allergies    Social History     Social History   • Marital status:      Spouse name: N/A   • Number of children: N/A   • Years of education: N/A     Occupational History   • Not on file.     Social History Main Topics   • Smoking status: Current Every Day Smoker     Years: 30.00     Types: Cigarettes   • Smokeless tobacco: Never Used   • Alcohol use No   • Drug use: Yes     Special: Marijuana   • Sexual activity: Defer     Other Topics Concern   • Not on file     Social History Narrative       Family History   Problem Relation Age of Onset   • Colon cancer Neg Hx    • Colon polyps Neg Hx    • Esophageal cancer Neg Hx    • Liver cancer Neg Hx    • Liver disease Neg Hx    • Rectal cancer Neg Hx    • Stomach cancer Neg Hx        Review of Systems   Constitutional: Positive for unexpected weight change.  "Negative for fatigue and fever.   HENT: Negative for hearing loss, sore throat and voice change.    Eyes: Negative for visual disturbance.   Respiratory: Negative for cough, shortness of breath and wheezing.    Cardiovascular: Negative for chest pain and palpitations.   Gastrointestinal: Positive for nausea. Negative for abdominal pain, blood in stool and vomiting.   Endocrine: Negative for polydipsia and polyuria.   Genitourinary: Negative for difficulty urinating, dysuria, hematuria and urgency.   Musculoskeletal: Negative for joint swelling and myalgias.   Skin: Negative for color change, rash and wound.   Neurological: Negative for dizziness, tremors, seizures and syncope.   Hematological: Does not bruise/bleed easily.   Psychiatric/Behavioral: Negative for agitation and confusion. The patient is not nervous/anxious.        Objective     Vitals:    09/06/17 0956   BP: 100/56   Pulse: 84   Temp: 98 °F (36.7 °C)   Weight: 85 lb (38.6 kg)   Height: 63\" (160 cm)     Body mass index is 15.06 kg/(m^2).    Physical Exam   Constitutional: She is oriented to person, place, and time. She appears well-developed and well-nourished.   HENT:   Head: Normocephalic and atraumatic.   Eyes: Conjunctivae are normal. Pupils are equal, round, and reactive to light. No scleral icterus.   Neck: No JVD present. No thyroid mass and no thyromegaly present.   Cardiovascular: Normal rate, regular rhythm and normal heart sounds.  Exam reveals no gallop and no friction rub.    No murmur heard.  Pulmonary/Chest: Effort normal and breath sounds normal. No accessory muscle usage. No respiratory distress. She has no wheezes. She has no rales.   Abdominal: Soft. Bowel sounds are normal. She exhibits no distension, no ascites and no mass. There is no splenomegaly or hepatomegaly. There is no tenderness. There is no rebound and no guarding.   Genitourinary:   Genitourinary Comments: Rectal-Did not examine   Musculoskeletal: Normal range of motion. " She exhibits no edema.   Neurological: She is alert and oriented to person, place, and time.   Deemed a reliable historian, able to converse without difficulty and able to move all extremities without difficulty   Skin: Skin is warm and dry.   Psychiatric: She has a normal mood and affect. Her behavior is normal.       Imaging Results (most recent)     None          Assessment/Plan     Zahraa was seen today for heartburn.    Diagnoses and all orders for this visit:    Gastroesophageal reflux disease, esophagitis presence not specified  -     Case Request; Standing  -     Implement Anesthesia Orders Day of Procedure; Standing  -     Obtain Informed Consent; Standing  -     Case Request    Weight loss, abnormal      ESOPHAGOGASTRODUODENOSCOPY WITH ANESTHESIA (N/A)    Advised pt to stop ASA day prior to procedure and to stop use of NSAIDs, Fish Oil, and MV 5 days prior to procedure.  Tylenol based products are ok to take.  Pt verbalized understanding.    During this visit I spent 3 minutes counseling patient on the importance of smoking cessation.  I advised the patient of the risks involved with the continued use of tobacco and provided the patient with smoking cessation education materials.    The risk of the endoscopy were discussed in detail.  We discussed the risk of perforation (one out of 2847-0048, riskier with dilation), bleeding (one out of 500), and the rare risks of infection, adverse reaction to anesthesia, respiratory failure, cardiac failure including MI and adverse reaction to medications, etc.  We discussed consequences that could occur if a risk were to develop such as the need for hospitalization, blood transfusion, surgical intervention, medications, pain and disability and death.  Alternatives include not doing anything, or pursuing an UGI series which only offers a diagnosis with potential less accuracy compared to egd.  The patient verbalizes understanding and agrees to proceed.        Patient  Instructions   Steps to Quit Smoking   Smoking tobacco can be harmful to your health and can affect almost every organ in your body. Smoking puts you, and those around you, at risk for developing many serious chronic diseases. Quitting smoking is difficult, but it is one of the best things that you can do for your health. It is never too late to quit.  WHAT ARE THE BENEFITS OF QUITTING SMOKING?  When you quit smoking, you lower your risk of developing serious diseases and conditions, such as:  · Lung cancer or lung disease, such as COPD.  · Heart disease.  · Stroke.  · Heart attack.  · Infertility.  · Osteoporosis and bone fractures.  Additionally, symptoms such as coughing, wheezing, and shortness of breath may get better when you quit. You may also find that you get sick less often because your body is stronger at fighting off colds and infections. If you are pregnant, quitting smoking can help to reduce your chances of having a baby of low birth weight.  HOW DO I GET READY TO QUIT?  When you decide to quit smoking, create a plan to make sure that you are successful. Before you quit:  · Pick a date to quit. Set a date within the next two weeks to give you time to prepare.  · Write down the reasons why you are quitting. Keep this list in places where you will see it often, such as on your bathroom mirror or in your car or wallet.  · Identify the people, places, things, and activities that make you want to smoke (triggers) and avoid them. Make sure to take these actions:    Throw away all cigarettes at home, at work, and in your car.    Throw away smoking accessories, such as ashtrays and lighters.    Clean your car and make sure to empty the ashtray.    Clean your home, including curtains and carpets.  · Tell your family, friends, and coworkers that you are quitting. Support from your loved ones can make quitting easier.  · Talk with your health care provider about your options for quitting smoking.  · Find out  "what treatment options are covered by your health insurance.  WHAT STRATEGIES CAN I USE TO QUIT SMOKING?   Talk with your healthcare provider about different strategies to quit smoking. Some strategies include:  · Quitting smoking altogether instead of gradually lessening how much you smoke over a period of time. Research shows that quitting \"cold turkey\" is more successful than gradually quitting.  · Attending in-person counseling to help you build problem-solving skills. You are more likely to have success in quitting if you attend several counseling sessions. Even short sessions of 10 minutes can be effective.  · Finding resources and support systems that can help you to quit smoking and remain smoke-free after you quit. These resources are most helpful when you use them often. They can include:    Online chats with a counselor.    Telephone quitlines.    Printed self-help materials.    Support groups or group counseling.    Text messaging programs.    Mobile phone applications.  · Taking medicines to help you quit smoking. (If you are pregnant or breastfeeding, talk with your health care provider first.) Some medicines contain nicotine and some do not. Both types of medicines help with cravings, but the medicines that include nicotine help to relieve withdrawal symptoms. Your health care provider may recommend:    Nicotine patches, gum, or lozenges.    Nicotine inhalers or sprays.    Non-nicotine medicine that is taken by mouth.  Talk with your health care provider about combining strategies, such as taking medicines while you are also receiving in-person counseling. Using these two strategies together makes you more likely to succeed in quitting than if you used either strategy on its own.  If you are pregnant or breastfeeding, talk with your health care provider about finding counseling or other support strategies to quit smoking. Do not take medicine to help you quit smoking unless told to do so by your " health care provider.  WHAT THINGS CAN I DO TO MAKE IT EASIER TO QUIT?  Quitting smoking might feel overwhelming at first, but there is a lot that you can do to make it easier. Take these important actions:  · Reach out to your family and friends and ask that they support and encourage you during this time. Call telephone quitlines, reach out to support groups, or work with a counselor for support.  · Ask people who smoke to avoid smoking around you.  · Avoid places that trigger you to smoke, such as bars, parties, or smoke-break areas at work.  · Spend time around people who do not smoke.  · Lessen stress in your life, because stress can be a smoking trigger for some people. To lessen stress, try:    Exercising regularly.    Deep-breathing exercises.    Yoga.    Meditating.    Performing a body scan. This involves closing your eyes, scanning your body from head to toe, and noticing which parts of your body are particularly tense. Purposefully relax the muscles in those areas.  · Download or purchase mobile phone or tablet apps (applications) that can help you stick to your quit plan by providing reminders, tips, and encouragement. There are many free apps, such as QuitGuide from the CDC (Centers for Disease Control and Prevention). You can find other support for quitting smoking (smoking cessation) through smokefree.gov and other websites.  HOW WILL I FEEL WHEN I QUIT SMOKING?  Within the first 24 hours of quitting smoking, you may start to feel some withdrawal symptoms. These symptoms are usually most noticeable 2-3 days after quitting, but they usually do not last beyond 2-3 weeks. Changes or symptoms that you might experience include:  · Mood swings.  · Restlessness, anxiety, or irritation.  · Difficulty concentrating.  · Dizziness.  · Strong cravings for sugary foods in addition to nicotine.  · Mild weight gain.  · Constipation.  · Nausea.  · Coughing or a sore throat.  · Changes in how your medicines work in  your body.  · A depressed mood.  · Difficulty sleeping (insomnia).  After the first 2-3 weeks of quitting, you may start to notice more positive results, such as:  · Improved sense of smell and taste.  · Decreased coughing and sore throat.  · Slower heart rate.  · Lower blood pressure.  · Clearer skin.  · The ability to breathe more easily.  · Fewer sick days.  Quitting smoking is very challenging for most people. Do not get discouraged if you are not successful the first time. Some people need to make many attempts to quit before they achieve long-term success. Do your best to stick to your quit plan, and talk with your health care provider if you have any questions or concerns.     This information is not intended to replace advice given to you by your health care provider. Make sure you discuss any questions you have with your health care provider.     Document Released: 12/12/2002 Document Revised: 05/03/2016 Document Reviewed: 05/03/2016  [employer] Interactive Patient Education ©2017 [employer]

## 2017-09-12 NOTE — PLAN OF CARE
Problem: Patient Care Overview (Adult)  Goal: Plan of Care Review  Outcome: Ongoing (interventions implemented as appropriate)    09/12/17 1155   Coping/Psychosocial Response Interventions   Plan Of Care Reviewed With patient   Patient Care Overview   Progress no change   Outcome Evaluation   Outcome Summary/Follow up Plan pt toleratedprocedure well.

## 2017-09-13 LAB
CYTO UR: NORMAL
LAB AP CASE REPORT: NORMAL
LAB AP CLINICAL INFORMATION: NORMAL
Lab: NORMAL
PATH REPORT.FINAL DX SPEC: NORMAL
PATH REPORT.GROSS SPEC: NORMAL
UREASE TISS QL: NEGATIVE

## 2017-09-14 ENCOUNTER — TELEPHONE (OUTPATIENT)
Dept: GASTROENTEROLOGY | Facility: CLINIC | Age: 47
End: 2017-09-14

## 2017-09-14 NOTE — TELEPHONE ENCOUNTER
----- Message from Yeison Espino DO sent at 9/13/2017  1:37 PM CDT -----   Please let her know both her small bowel/gastric bx were neg      ----- Message -----     From: Lab, Background User     Sent: 9/13/2017  12:35 PM       To: Yeison Espino DO        Called patient gave results.

## 2017-10-30 ENCOUNTER — TRANSCRIBE ORDERS (OUTPATIENT)
Dept: OTHER | Facility: HOSPITAL | Age: 47
End: 2017-10-30

## 2017-10-30 ENCOUNTER — TRANSCRIBE ORDERS (OUTPATIENT)
Dept: ADMINISTRATIVE | Facility: HOSPITAL | Age: 47
End: 2017-10-30

## 2017-10-30 DIAGNOSIS — R06.02 SOBOE (SHORTNESS OF BREATH ON EXERTION): Primary | ICD-10-CM

## 2017-11-02 ENCOUNTER — HOSPITAL ENCOUNTER (OUTPATIENT)
Dept: CT IMAGING | Facility: HOSPITAL | Age: 47
Discharge: HOME | End: 2017-11-02
Admitting: NURSE PRACTITIONER

## 2017-11-02 DIAGNOSIS — R06.02 SOBOE (SHORTNESS OF BREATH ON EXERTION): ICD-10-CM

## 2017-11-02 PROCEDURE — 71270 CT THORAX DX C-/C+: CPT

## 2017-11-02 PROCEDURE — 82565 ASSAY OF CREATININE: CPT

## 2017-11-02 PROCEDURE — 0 IOPAMIDOL 61 % SOLUTION: Performed by: NURSE PRACTITIONER

## 2017-11-02 RX ADMIN — IOPAMIDOL 100 ML: 612 INJECTION, SOLUTION INTRAVENOUS at 12:00

## 2017-11-03 LAB — CREAT BLDA-MCNC: 0.6 MG/DL (ref 0.6–1.3)

## 2020-06-22 ENCOUNTER — TELEPHONE (OUTPATIENT)
Dept: OTOLARYNGOLOGY | Facility: CLINIC | Age: 50
End: 2020-06-22

## 2020-07-08 ENCOUNTER — TRANSCRIBE ORDERS (OUTPATIENT)
Dept: ADMINISTRATIVE | Facility: HOSPITAL | Age: 50
End: 2020-07-08

## 2020-07-08 DIAGNOSIS — Z01.818 PRE-OP TESTING: Primary | ICD-10-CM

## 2020-07-10 ENCOUNTER — LAB (OUTPATIENT)
Dept: LAB | Facility: HOSPITAL | Age: 50
End: 2020-07-10

## 2020-07-10 PROCEDURE — C9803 HOPD COVID-19 SPEC COLLECT: HCPCS | Performed by: OTOLARYNGOLOGY

## 2020-07-10 PROCEDURE — U0003 INFECTIOUS AGENT DETECTION BY NUCLEIC ACID (DNA OR RNA); SEVERE ACUTE RESPIRATORY SYNDROME CORONAVIRUS 2 (SARS-COV-2) (CORONAVIRUS DISEASE [COVID-19]), AMPLIFIED PROBE TECHNIQUE, MAKING USE OF HIGH THROUGHPUT TECHNOLOGIES AS DESCRIBED BY CMS-2020-01-R: HCPCS | Performed by: OTOLARYNGOLOGY

## 2020-07-12 LAB
COVID LABCORP PRIORITY: NORMAL
SARS-COV-2 RNA RESP QL NAA+PROBE: NOT DETECTED

## 2020-07-14 ENCOUNTER — OFFICE VISIT (OUTPATIENT)
Dept: OTOLARYNGOLOGY | Facility: CLINIC | Age: 50
End: 2020-07-14

## 2020-07-14 VITALS — HEIGHT: 63 IN | BODY MASS INDEX: 18.96 KG/M2 | HEART RATE: 98 BPM | TEMPERATURE: 97.7 F | WEIGHT: 107 LBS

## 2020-07-14 DIAGNOSIS — K21.9 GASTROESOPHAGEAL REFLUX DISEASE WITHOUT ESOPHAGITIS: ICD-10-CM

## 2020-07-14 DIAGNOSIS — J38.1 REINKE'S EDEMA OF VOCAL FOLDS: ICD-10-CM

## 2020-07-14 DIAGNOSIS — Z98.890 S/P EXCISION OF VOCAL CORD NODULE: ICD-10-CM

## 2020-07-14 DIAGNOSIS — R49.0 DYSPHONIA: ICD-10-CM

## 2020-07-14 DIAGNOSIS — R09.89 GLOBUS PHARYNGEUS: Primary | ICD-10-CM

## 2020-07-14 PROCEDURE — 99203 OFFICE O/P NEW LOW 30 MIN: CPT | Performed by: OTOLARYNGOLOGY

## 2020-07-14 RX ORDER — FAMOTIDINE 20 MG/1
20 TABLET, FILM COATED ORAL EVERY MORNING
Qty: 30 TABLET | Refills: 3 | Status: SHIPPED | OUTPATIENT
Start: 2020-07-14

## 2020-07-14 RX ORDER — METHYLPREDNISOLONE 4 MG/1
TABLET ORAL
COMMUNITY
Start: 2020-07-09

## 2020-07-14 RX ORDER — ALBUTEROL SULFATE 90 UG/1
AEROSOL, METERED RESPIRATORY (INHALATION)
COMMUNITY
Start: 2020-04-14

## 2020-07-14 RX ORDER — ESOMEPRAZOLE MAGNESIUM 40 MG/1
40 CAPSULE, DELAYED RELEASE ORAL
Qty: 30 CAPSULE | Refills: 3 | Status: SHIPPED | OUTPATIENT
Start: 2020-07-14 | End: 2020-08-13

## 2020-07-14 RX ORDER — BUPROPION HYDROCHLORIDE 100 MG/1
TABLET ORAL
COMMUNITY

## 2020-07-14 RX ORDER — FLUTICASONE PROPIONATE 50 MCG
2 SPRAY, SUSPENSION (ML) NASAL 2 TIMES DAILY
Qty: 48 G | Refills: 3 | Status: SHIPPED | OUTPATIENT
Start: 2020-07-14

## 2020-07-14 RX ORDER — FLUTICASONE PROPIONATE 50 MCG
SPRAY, SUSPENSION (ML) NASAL
COMMUNITY

## 2020-07-14 RX ORDER — OMEPRAZOLE 40 MG/1
40 CAPSULE, DELAYED RELEASE ORAL
COMMUNITY
Start: 2020-04-14

## 2020-07-14 RX ORDER — FEXOFENADINE HCL AND PSEUDOEPHEDRINE HCI 180; 240 MG/1; MG/1
1 TABLET, EXTENDED RELEASE ORAL DAILY
COMMUNITY

## 2020-07-14 NOTE — PROGRESS NOTES
Nish Laird Jr, MD   ENT PROCEDURE NOTE:  Anesthesia:   topical 4% tetracaine and oxymetazoline mix    Endoscopy Type:   Flexible Laryngoscopy    Indications for Procedure:   1. Globus pharyngeus    2. Gastroesophageal reflux disease without esophagitis    3. S/P excision of vocal cord nodule    4. Dysphonia    5. Savanna's edema of vocal folds         Procedure Details:    The patient was placed in an upright position.  The laryngoscope was passed left nasal passge.  The nasal cavities, nasopharynx, oropharynx, hypopharynx, and larynx were all examined.  Vocal cords were examined during respiration and phonation.  The following findings were noted:    Findings:   LARYNGOSCOPY FINDINGS :    NASOPHARYNX:     adenoids  flat with small tuft superior, no lesion or redness, no lesions, Eustachian tubes normal, without lesions, palate with normal mobility without lesions.  OROPHARYNX:     normal mucosa, without lesions, tonsils  atrophic  EPIGLOTTIS:    Position: min retroverted    Color: pale    Mucosa: intact    Shape: normal  HYPOPHARYNX:    Lateral walls:          normal, no lesions  Bilateral    Posterior wall:        midline posterior to arytenoids full and hypertorphic  ARYEPIGLOTTIC FOLDS:     normal mucosa, no lesions  ARYTENOIDS:     Mobility:       Normal   Bilateral    [place] size:         BILATERAL: mildly increased  FALSE CORDS:     normal mucosa, no lesions, normal mobility  Bilateral- no lesions   TRUE VOCAL FOLDS:      appearance:         BILATERAL: very mild edema    mobility        BILATERAL: normal    mucosa:        BILATERAL: no lesion, no scarring  GLOTTIS:     normal closure, with good cord apposition  SUBGLOTTIS:     unobstructed, patent, no lesions    Condition:  Stable.  Patient tolerated procedure well.    Complications:  None    Post-procedure instructions reviewed with Patient  Instructions documented in AVS for patient to review

## 2020-07-14 NOTE — PROGRESS NOTES
Nish Laird Jr, MD     ENT NEW PATIENT NOTE     Chief Complaint   Patient presents with   • Difficulty Swallowing        HISTORY OF PRESENT ILLNESS:  Accompanied by:   No one  Zahraa Nice is a  50 y.o. female with something in throat.  She is a smoker- quit 1 year. She vapes.  She has had Vocal nodule removed.  She had L TVC biopsy.  She says she has something in back of throat. She says feels same as prior to surgery.  She threw chemicals in hot tube and went in. Bromine caused irritation.  See Pulm. Medications adjusted. Seeing a new one.  Drink- occasionally.  Reflux- she is being treated. She has heartburn. Worse. Nexium helps. Uses Pepto at night. Helps some. Worse at night.  Voice- will ose completely at times. Noting deeper pitch to voice.  Dust affects.    Review of Systems  Reviewed per patient intake note and confirmed with patient    Past History:  Past Medical History:   Diagnosis Date   • Asthma    • GERD (gastroesophageal reflux disease)    • Hyperlipidemia    • Hypothyroidism    • IBS (irritable bowel syndrome)    • PONV (postoperative nausea and vomiting)    • Vaginal cancer (CMS/HCC)      Past Surgical History:   Procedure Laterality Date   • APPENDECTOMY     • BACK SURGERY     • CHOLECYSTECTOMY     • COLONOSCOPY     • COLONOSCOPY N/A 3/22/2017    Procedure: COLONOSCOPY WITH ANESTHESIA;  Surgeon: Yeison sEpino DO;  Location: [facility] ENDOSCOPY;  Service:    • ENDOSCOPY N/A 9/12/2017    Procedure: ESOPHAGOGASTRODUODENOSCOPY WITH ANESTHESIA;  Surgeon: Yeison Espino DO;  Location: [facility] ENDOSCOPY;  Service:    • HYSTERECTOMY     • THROAT SURGERY       Family History   Problem Relation Age of Onset   • Colon cancer Neg Hx    • Colon polyps Neg Hx    • Esophageal cancer Neg Hx    • Liver cancer Neg Hx    • Liver disease Neg Hx    • Rectal cancer Neg Hx    • Stomach cancer Neg Hx      Social History     Tobacco Use   • Smoking status: Former Smoker     Packs/day: 1.00     Years: 30.00     Pack  years: 30.00     Types: Cigarettes     Last attempt to quit: 2019     Years since quittin.0   • Smokeless tobacco: Never Used   Substance Use Topics   • Alcohol use: No   • Drug use: Yes     Types: Marijuana     Outpatient Medications Marked as Taking for the 20 encounter (Office Visit) with Nish Laird Jr., MD   Medication Sig Dispense Refill   • fexofenadine-pseudoephedrine (ALLEGRA-D 24) 180-240 MG per 24 hr tablet Take 1 tablet by mouth Daily.       Allergies:  Patient has no known allergies.        Vital Signs:   Temp:  [97.7 °F (36.5 °C)] 97.7 °F (36.5 °C)  Heart Rate:  [98] 98  EXAMINATION:  CONSTITUTIONAL:    well nourished, well-developed, alert, oriented, in no acute distress     BODY HABITUS:    Normal body habitus    COMMUNICATION:    able to communicate normally, normal voice quality    HEAD:     Normocephalic, without obvious abnormality, atraumatic    FACE:    structure normal, no tenderness present, no lesions/masses, no evidence of trauma    SALIVARY GLANDS:    parotid glands with no tenderness, no swelling, no masses, submandibular glands with normal size, nontender     EYE:    ocular motility normal, eyelids normal, orbits normal, no proptosis, conjunctiva clear, sclera non-icteric, pupils equal, round, reactive to light and accomodation  Color:   brown    HEARING:    response to conversational voice normal    EARS:    Otoscopic exam   normal pinna with no lesions, Canals normal size and shape, Tympanic membranes normal, Ossicular chain intact, Middle ear clear     NOSE EXTERNAL:    APPEARANCE: normal, straight, with good projection, no tenderness, no lesions, no tenderness, good nasal support, patent nares    NOSE INTERNAL:    Anterior rhinoscopy   NASALMUCOSA:    abnormal:        Left- thin, pale, Right- red, thin    abnormal   Bilateral   NASAL VALVE:     abnormal  Bilateral- weakness    SEPTUM:     mucosa abnormal   Left- thin, pale, Right- thin, red    deviation  present:      deviated Right low and mid severe   INFERIOR TURBINATES:     normal size, non-obstructing, no lesions    ORAL CAVITY:    Normal lips with no lesions, dentition normal for age, FOM intact without lesions and normal salivary flow, Mucosa intact without lesions, Hard and soft palate normal without lesions    OROPHARYNX:    Direct examination  oropharyngeal mucosa normal, tonsil(s) with normal appearance      NECK:    normal appearance, no masses, no lesions, larynx normal mobility, trachea midline  thin    LYMPH NODES :    no adenopathy    THYROID:    no overt thyromegaly, no tenderness, nodules or mass present on palpation, position midline    CHEST/RESPIRATORY:    respiratory effort normal, no rales, rubs or wheezing, no stridor, normal appearance to chest    CARDIOVASCULAR:    regular rate and rhythm, no murmurs, gallups, no peripheral edema    NEURO/PSYCHIATRIC :    oriented appropriately for age, mood normal, affect appropriate, cranial nerves intact grossly (unless specifically described), gait normal for age    RESULTS REVIEW:    I have reviewed the patients old records in the chart.           ASSESSMENT:     Diagnosis Plan   1. Globus pharyngeus  FL Video Swallow With Speech Single Contrast    post arytenoid area   2. Gastroesophageal reflux disease without esophagitis      poor control   3. S/P excision of vocal cord nodule      Normal appearing cord   4. Dysphonia  FL Video Swallow With Speech Single Contrast    mild   5. Savanna's edema of vocal folds      very mild           PLAN:      Conservative management.  Patient has had past throat and TVC issues. I see not TVC lesion, but there is mild edema. I see signs of chronic reflux.  I will get MODIFIED BARIUM SWALLOW to evaluate swallowing and possible weakness in pharynx. This can cause voice changes. 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 She has had no prior SPEECH LANGUAGE PATHOLOGY evaluation for  vocal cords. I will start voice hygiene, Reflux precautions.  Reflux precautions  Nexium at bedtime  Pepcid in the AM  Antacids  Voice Hygiene  Vape cessation  Diet  Exercise  MY CHART:  Patient is Encouraged to enroll in My Chart  Encouraged to review data and findings in My Chart  New Medications Ordered This Visit   Medications   • esomeprazole (nexIUM) 40 MG capsule     Sig: Take 1 capsule by mouth every night at bedtime for 30 doses.     Dispense:  30 capsule     Refill:  3   • famotidine (PEPCID) 20 MG tablet     Sig: Take 1 tablet by mouth Every Morning. Take an hour before meals     Dispense:  30 tablet     Refill:  3   • fluticasone (FLONASE) 50 MCG/ACT nasal spray     Si sprays into the nostril(s) as directed by provider 2 (Two) Times a Day.     Dispense:  48 g     Refill:  3     Orders Placed This Encounter   Procedures   • FL Video Swallow With Speech Single Contrast          Patient understand(s) and agree(s) with the treatment plan as described.  Return After MBS, for Recheck Voice, ELIUD.       Nish Laird Jr, MD  20  11:16

## 2020-07-14 NOTE — PROGRESS NOTES
Berta Mena LPN   Patient Intake Note    Review of Systems  Review of Systems   Constitutional: Negative for appetite change, chills and fatigue.   HENT:        See HPI   Respiratory: Negative for cough, choking and shortness of breath.    Gastrointestinal: Negative for diarrhea, nausea and vomiting.   Skin: Negative for rash.   Neurological: Negative for dizziness, light-headedness and headaches.   Psychiatric/Behavioral: Negative for sleep disturbance.       Tobacco Use: Screening and Cessation Intervention  Social History    Tobacco Use      Smoking status: Former Smoker        Packs/day: 1.00        Years: 30.00        Pack years: 30        Types: Cigarettes        Quit date: 2019        Years since quittin.0      Smokeless tobacco: Never Used        Berta Mena LPN  2020  10:48

## 2020-07-14 NOTE — PATIENT INSTRUCTIONS
"THE PROBLEM OF ACID REFLUX    In BOTH children and adults, irritating acids may leak from the stomach and come up into the esophagus and throat. This can occur at any time, but occurs more commonly when lying down. When the acid touches the lining of the esophagus, there is irritation and muscle spasm, which can be felt up into the throat. Usually this is felt as \"heartburn\". When scarring of the esophagus occurs, the esophagus becomes less sensitive and the symptoms may only be in the throat.     Some of the symptoms that can occur with acid reflux into the throat include coughing, soreness, burning, hoarseness, throat clearing, excessive mucous, bad taste in the mouth, bad breath, a sensation of a lump in the throat, wheezing, post-nasal drip, choking spells, bleeding and nausea. In a small percentage of people, more serious problems may result, such as pneumonia, ulcers in the larynx (voice box), reduced mobility of the vocal cords and a pouch in the upper esophagus (diverticulum). In children, the symptoms may be different and include persistent vomiting, bleeding from the esophagus, respiratory problems, pneumonia, asthma, choking spells, swallowing problems and anemia. In some cases, unexplained fussiness and crying is due to reflux.     The following instructions are designed to help neutralize the stomach acid, reduce the production of the acid, promote emptying of the acid from the stomach into the intestines and prevent acid from coming up into the esophagus. You should adopt enough of these changes to alleviate your symptoms. If carrying out these suggestions produces no change, it is imperative that you return so that we can discuss further the problem. More testing may be required, as might medication. It is important to realize that the esophagus takes time to heal, so you should not expect results immediately.    Diet  Most often, the throat symptoms above are due to dietary abuses. Certain foods are " known to cause irritation, because they are acids themselves or cause excess production of stomach acid. These should be avoided, if possible. The following is a partial list of foods recognized as troublesome: coffee (even decaffeinated), tea chocolate, cola beverages, citrus beverages (such as 7-Up), caffeine containing beverages, alcohol, citrus fruits and juices, highly spiced foods, fatty foods, candies, nuts, mints, milk and milk products. Some of the worst offenders for promoting stomach acid are milk and beer.     Hard candies, gum, breath fresheners, throat lozenges, cough drops, mouthwashes, gargles, may actually irritate the throat directly (many cough drops and lozenges contain irritants such as oil of eucalyptus and menthol), and can also stimulate acid production directly.     Large amounts of liquid in the diet tend to promote reflux, because liquids tend to come back up more easily than solids.     Meals should be bland and consist of real food, trying to avoid recreational food. Multiple small meals, rather than one or two large meals helps prevent reflux by not stretching the stomach and increasing the time needed for digestion, as well increasing the amount of acid needed to digest the meal. If you are overweight, losing weight is tremendously helpful.     YOU SHOULD NOT EAT FOR AT LEAST TWO HOURS BEFORE RETIRING AND YOU SHOULD REMAIN SITTING OR STANDING FOR AT LEAST 45 MINUTES AFTER EACH MEAL. This promotes passage of food from the stomach into the intestine.    Posture  Body weight is a significant factor in promoting reflux. Losing weight is beneficial. Pregnancy will markedly increase the symptoms of heartburn and throat pain. You should avoid clothing that fits tightly across the mid-section, as this promotes squeezing of the abdominal contents upward. It is helpful to practice abdominal or diaphragmatic breathing, using the stomach to push out each breath rather than chest expansion. Avoid  slumping while sitting, and avoid stooping or straining.     For many, the reflux occurs at night while sleeping. This is the time acid production is the greatest and you are lying down, a position that promotes reflux, thus setting up the irritation that occurs the next morning. One of the most important things you can do is to elevate the head of the bed, in an effort to use gravity to keep the acid in the stomach. This is accomplished by placed blocks or bricks beneath the legs at the head of the bed, raising the head 6-10 inches. Do not make the head so high that you slide down. Pillows are not effective because they cause the body to curl, increasing abdominal pressure and it is difficult to remain upright on them. Additionally, pillows promote sleeping on the back, but it is far more desirable to sleep on the right side or on the stomach, as this allows gas to escape, while preventing the escape of acid material. Children and infants, who are refluxing, should sleep on their stomachs.  Exercise  Regular exercise is extremely beneficial in promoting good digestion and regularity. The abdominal muscles are toned, which aids in controlling acid problems. However, it is recommended that you not participate in vigorous activity immediately after eating. This causes pressure on an already full stomach, forcing acid up into the esophagus. Regular exercise is encouraged, prior to meals, or two hours after meals.  Antacids  Antacids should be used regularly, as they neutralize excess acid. Gelusil II, Mylanta II, Tums and Rolaids are popular brands. Gaviscon is not an antacid, but floats on top of the stomach acid, preventing esophageal irritation. Care should be used in administering large doses of antacids, especially in children. Many antacid preparations contain magnesium, which promotes frequent bowel movements, while antacids that contain aluminum can be constipating. Tums have a high calcium content that can be  used to some advantage in older women with reflux.     Antacid tablets are convenient, but the liquid form tends to work much more quickly. Also remember to check with your physician about interference with other medications. Antacids can slow the absorption of digitalis, Indocin, tetracyclines, fluorides and isoniazid from the intestines. Many medications require the presence of stomach acid to be absorbed.     Initially, antacids should be used 30 minutes after each meal, 2 hours after each meal and at bedtime. This maximizes the neutralization of the stomach acid. Antacids can be used more frequently if symptoms persist, but such extensive use needs to be reviewed with your physician.  Prescription Medications  If the above recommendations are not effectively resolving the symptoms, medications may be prescribed. These are usually in the form of acid production blockers, such as Tagamet, Zantac, Pepcid, or Axid or acid pump inhibitors like Prevacid, Nexium, Protonix or Prilosec. These drugs help stop acid production in the stomach. Medications such as Reglan can be used to promote stomach emptying.  Medications That Promote Reflux  Certain medications can promote acid reflux; either by relaxing the sphincter muscle that helps keeps stomach acid down, or increasing the acid production. These include progesterone (Provera, Ortho Novum, Ovral, other birth control pills), theophylline (Ministerio-Dur, etc.), anticholinergic (Donnatal, Scopolamine, Probanthine, Bentil, others), beta blockers (Inderal, Tenormin and Corgard), alpha blockers (Dibenzyline), dopamine, calcium channel blockers (Procardia, Cardizem, Isotin, Calan), aspirin, non-steroidal anti-inflammatory drugs (Motrin, Advil, Nuprin, Nalfon, Rufen, Indocin, Feldene, Clinoril and Tolectin). Vitamin C is an acid and can promote reflux. This is only a partial list and before stopping any prescription medication, you should check with your physician.    Goal  The  goal is to reduce the symptoms with the least number of lifestyle changes. A combination of the above suggestions is frequently prescribed to return you to normal as quickly as possible. However, this problem did not develop overnight and will not disappear rapidly. Therefore, developing a regimen will aid in controlling symptoms and keep you symptom free for a long period of time. Other alternatives exist, should these suggestions fail, and can be discussed with your physician.     To Summarize:  Watch your diet, avoid foods that cause acid reflux  Lose weight  Avoid tight fitting clothes  Adjust your posture, raise the head of the bed  Exercise regularly  Use antacids  Use prescription medication as directed  Avoid medications that promote reflux  Avoid behavior and eating habits that promote reflux of stomach acid     You are welcome to do a Google search on the topic of reflux and reflux diets. The internet has many useful resources.     Please remember, your success in controlling this condition depends on many factors including diet, exercise, medications and follow up. All are important and must be utilized to achieve success.     REFLUX MEDICATION USE:   Do Take:, Esomeprazole/Nexium, nightly  Famotidine (Pepcid)  daily        ANTACID USE:  yes  Use antacids 30 mins after every meal, 2 hours after every meal and at Bedtime  Use Gaviscon Extra (best), Tums, Rolaids, etc  Over the counter  Use 2 tsp or 2 tabs as the dose  Remember that some of these products contain Calcium or Aluminum. If you have dietary or medical issues, please inform physician    VOICE HYGIENE:    Many factors go into manufacturing what we call the voice. More goes on than just breathing out and using the voice box and mouth. Creating the voice requires good lung function, a healthy voice box, and complex muscle coordination. Any malfunction in any of the systems and voice problems can occur.    The most common problem seen in the office  is hoarseness. Other types of voice problems can include breathiness, double voice, raspy voice, or simply a change in the pitch. Many things can cause a hoarse or weak voice, including laryngitis, vocal abuse (screaming, cheering, singing too loud for too long), smoking, etc. In order to treat the problem, your physician first must find the cause, then try to correct it to restore your normal voice.    The first part of a voice analysis is a detailed history of the problem, including any habits such as smoking, to delineate possible factors. The second step is a careful examination of the head and neck, including the voice box. The examination is essential, as this eliminates any anatomic problems with the vocal cords and the surrounding structures. More detailed analysis may be used in the form of videotaping the vocal cords with a stroboscopic light, thus providing additional information.     Suggestions for Voice Use and Conservation    Treatment of voice problems depends on the cause of the problems. Fortunately, most problems are easily correctable. The following are a few suggestions you will be asked to carry out, depending on the results of your examination.    >Avoid stress  >Avoid habitually clearing throat as this strains and fatigues the vocal cords  >Avoid yelling, especially for extended periods of time  >Avoid falsetto singing  >Avoid talking when it hurts or if your neck is excessively tired.  >Do exercise regularly to maintain fitness and breathe control. Do eat correctly, to avoid acid reflux  >Drink 6-8 glasses of water daily to keep the vocal cords flexible, making it easier to speak  >Do take sips of water while speaking as swallowing helps relax the muscles of the throat and neck  >Do sip water, sniff or say the letter “H” forcefully rather than clearing throat  >Do listen to your voice as you speak to avoid trailing off or “tightness” at the end of sentences  >Do use plenty of breath while  speaking. If you feel you are running out of breath, stop, breathe then continue.  >Do speak easily rather than pushing, forcing or straining  >Do stop talking and take a voice break, especially if you are fading or fatiguing  >Do increase your awareness of your voice. When does it sound better, worse? >Recording your voice or watching yourself speak in a mirror to watch for movement in the neck and shoulders can be beneficial.    Whispering is not a protective mechanism for the voice. In fact, whispering can make your condition worse. In order to limit further damage to your voice, maintain a normal volume and tone. Only in certain circumstances will this recommendation require modification. Dr. Laird will tell you if that is the case.    Complete voice rest is used occasionally to treat voice and vocal cord problems. However, this is a rare situation. Dr. Laird will tell you if this is indicated.    A great deal of voice hygiene is based on common sense. If it hurts to talk, then a problem exists.    Vocal Strengthening Exercises    >Sit in a comfortable and upright position. The chair should have a hard back and hard arm rests.  >Rest your hands on the arms of the chair. Push down with both of your hands at the same time. Do not use too much shoulder movement, simply push with your hands. You should feel a tightening of your abdominal muscles, not your neck muscles.  >After you become comfortable with the pushing motion, begin to make a short sound (such as at/ ah) each time you push down. You can substitute other short sounds such as a vowel sound or counting.  >Be sure to take a deep breath before each push down and vocalization of the sound.  Summary of exercise sequence:   Take a deep breath in  Push down while saying ah  Do NOT hold your breath  Relax and breathe out  Repeat above sequence 15-20 times every hour or several times each day.    Speech Pathology    Frequently, physicians and speech  pathologists treat voice disorders together. These are highly trained individuals who specialize in therapy for the voice box and throat. You may recognize the better as speech, swallowing or voice therapists. Dr. Laird may elect to refer you for evaluation and treatment, depending on your diagnosis and condition.    Special Testing    You may be referred certain tests to fully evaluate your throat, swallowing or voice. These may include CAT scans, swallowing tests, MRIs, or videostroboscopy. You may not be familiar these tests but the results will discussed them with you at the time of your office visit and answer your questions about them.    NASAL SALINE:  Use 2 puffs each nostril 4-6 times daily and more frequently if possible.  You can buy saline spray or you can make your own and use an old spray bottle to administer  Use a humidifier at bedside  Recipe for saline:d  Water                                 1 quart  Salt (table)                        1 tablespoon  Gylcerin (or Carmen Syrup)    1 teaspoon  Sodium bicarbonate           1 teaspoon  Sprays or Barb pots are recommended    Nasal steroid use:  Using nasal steroids:  You will be prescribed one of the following nasal steroids: Flonase, Nasacort, Nasonex, Rhinocort, Qnasl, Zetonna  2 puffs each nostril 2 times daily  Start as soon as possible  If you are using Afrin for 3 days with the nasal steroid,  Use Afrin first and wait 10 minutes to allow the nose to open. Then administer nasal steroids.    Thank you for enrolling in [facility]. Please follow the instructions below to securely access your online medical record. [facility] allows you to send messages to your doctor, view your test results, renew your prescriptions, schedule appointments, and more.    How Do I Sign Up?  1. In your Internet browser, go to [employer]  2. Click on the Sign Up Now link in the New User? box.   3. Enter your [facility] Activation Code exactly as it appears below.  You will not need to use this code after you have completed the sign-up process. If you do not sign up before the expiration date, you must request a new code.  [facility] Activation Code: Activation code not generated  Current [facility] Status: Account disabled    4. Enter the last four digits of your Social Security Number and your Date of Birth as indicated and click Next. You will be taken to the next sign-up page.  5. Create a [facility] username. Think of one that is secure and easy to remember.  6. Create a [facility] password. You can change your password at any time.  7. Choose a security question, enter your answer, and click Next. This can be used to access [facility] if you forget your password.   8. Select your communication preference. Enter a valid e-mail address if you would like to receive e-mail notifications when new information is available in [facility].  9. Click Sign In. You can now view your medical record.     Additional Information  If you have questions, you can e-mail [email or web address], or call [phone] to talk to our [facility] staff. Remember, [facility] is NOT to be used for urgent needs. For medical emergencies, dial 911.

## (undated) DEVICE — CUFF,BP,DISP,1 TUBE,ADULT,HP: Brand: MEDLINE

## (undated) DEVICE — THE CHANNEL CLEANING BRUSH IS A NYLON FLEXI BRUSH ATTACHED TO A FLEXIBLE PLASTIC SHEATH DESIGNED TO SAFELY REMOVE DEBRIS FROM FLEXIBLE ENDOSCOPES.

## (undated) DEVICE — Device: Brand: DEFENDO AIR/WATER/SUCTION AND BIOPSY VALVE

## (undated) DEVICE — TBG SMPL FLTR LINE NASL 02/C02 A/ BX/100

## (undated) DEVICE — SENSR O2 OXIMAX FNGR A/ 18IN NONSTR

## (undated) DEVICE — ENDOGATOR AUXILIARY WATER JET CONNECTOR: Brand: ENDOGATOR

## (undated) DEVICE — MSK O2 MD CONCENTR A/ LF 7FT 1P/U

## (undated) DEVICE — CONMED SCOPE SAVER BITE BLOCK, 20X27 MM: Brand: SCOPE SAVER

## (undated) DEVICE — FRCP BX RADJAW4 NDL 2.8 240 STD OG